# Patient Record
Sex: MALE | Race: AMERICAN INDIAN OR ALASKA NATIVE
[De-identification: names, ages, dates, MRNs, and addresses within clinical notes are randomized per-mention and may not be internally consistent; named-entity substitution may affect disease eponyms.]

---

## 2017-09-25 ENCOUNTER — HOSPITAL ENCOUNTER (OUTPATIENT)
Dept: HOSPITAL 42 - ED | Age: 82
Setting detail: OBSERVATION
LOS: 1 days | Discharge: HOME | End: 2017-09-26
Attending: INTERNAL MEDICINE | Admitting: INTERNAL MEDICINE
Payer: MEDICARE

## 2017-09-25 VITALS — BODY MASS INDEX: 26 KG/M2

## 2017-09-25 VITALS — OXYGEN SATURATION: 98 %

## 2017-09-25 DIAGNOSIS — R55: Primary | ICD-10-CM

## 2017-09-25 DIAGNOSIS — I48.91: ICD-10-CM

## 2017-09-25 DIAGNOSIS — I11.9: ICD-10-CM

## 2017-09-25 DIAGNOSIS — Z95.2: ICD-10-CM

## 2017-09-25 DIAGNOSIS — Z95.1: ICD-10-CM

## 2017-09-25 DIAGNOSIS — Z79.01: ICD-10-CM

## 2017-09-25 LAB
ALBUMIN/GLOB SERPL: 1.1 {RATIO} (ref 1.1–1.8)
ALP SERPL-CCNC: 104 U/L (ref 38–126)
ALT SERPL-CCNC: 16 U/L (ref 7–56)
APTT BLD: 38 SECONDS (ref 23.7–30.8)
AST SERPL-CCNC: 36 U/L (ref 17–59)
BASOPHILS # BLD AUTO: 0.02 K/MM3 (ref 0–2)
BASOPHILS NFR BLD: 0.2 % (ref 0–3)
BILIRUB SERPL-MCNC: 1.3 MG/DL (ref 0.2–1.3)
BUN SERPL-MCNC: 45 MG/DL (ref 7–21)
CALCIUM SERPL-MCNC: 9.5 MG/DL (ref 8.4–10.5)
CHLORIDE SERPL-SCNC: 101 MMOL/L (ref 98–107)
CO2 SERPL-SCNC: 27 MMOL/L (ref 21–33)
EOSINOPHIL # BLD: 0.1 10*3/UL (ref 0–0.7)
EOSINOPHIL NFR BLD: 0.9 % (ref 1.5–5)
ERYTHROCYTE [DISTWIDTH] IN BLOOD BY AUTOMATED COUNT: 14.2 % (ref 11.5–14.5)
GLOBULIN SER-MCNC: 3.8 GM/DL
GLUCOSE SERPL-MCNC: 120 MG/DL (ref 70–110)
GRANULOCYTES # BLD: 6.25 10*3/UL (ref 1.4–6.5)
GRANULOCYTES NFR BLD: 70.7 % (ref 50–68)
HCT VFR BLD CALC: 39.2 % (ref 42–52)
INR PPP: 2.47 (ref 0.93–1.08)
LYMPHOCYTES # BLD: 1.5 10*3/UL (ref 1.2–3.4)
LYMPHOCYTES NFR BLD AUTO: 16.7 % (ref 22–35)
MCH RBC QN AUTO: 29.1 PG (ref 25–35)
MCHC RBC AUTO-ENTMCNC: 33.7 G/DL (ref 31–37)
MCV RBC AUTO: 86.5 FL (ref 80–105)
MONOCYTES # BLD AUTO: 1 10*3/UL (ref 0.1–0.6)
MONOCYTES NFR BLD: 11.5 % (ref 1–6)
PLATELET # BLD: 199 10^3/UL (ref 120–450)
PMV BLD AUTO: 9.8 FL (ref 7–11)
POTASSIUM SERPL-SCNC: 4.6 MMOL/L (ref 3.6–5)
PROT SERPL-MCNC: 7.9 G/DL (ref 5.8–8.3)
SODIUM SERPL-SCNC: 138 MMOL/L (ref 132–148)
TROPONIN I SERPL-MCNC: < 0.01 NG/ML
WBC # BLD AUTO: 8.9 10^3/UL (ref 4.5–11)

## 2017-09-25 PROCEDURE — 84484 ASSAY OF TROPONIN QUANT: CPT

## 2017-09-25 PROCEDURE — 85610 PROTHROMBIN TIME: CPT

## 2017-09-25 PROCEDURE — 85730 THROMBOPLASTIN TIME PARTIAL: CPT

## 2017-09-25 PROCEDURE — 70450 CT HEAD/BRAIN W/O DYE: CPT

## 2017-09-25 PROCEDURE — 71010: CPT

## 2017-09-25 PROCEDURE — 85025 COMPLETE CBC W/AUTO DIFF WBC: CPT

## 2017-09-25 PROCEDURE — 99285 EMERGENCY DEPT VISIT HI MDM: CPT

## 2017-09-25 PROCEDURE — 93005 ELECTROCARDIOGRAM TRACING: CPT

## 2017-09-25 PROCEDURE — 96360 HYDRATION IV INFUSION INIT: CPT

## 2017-09-25 PROCEDURE — 96361 HYDRATE IV INFUSION ADD-ON: CPT

## 2017-09-25 PROCEDURE — 80053 COMPREHEN METABOLIC PANEL: CPT

## 2017-09-25 PROCEDURE — 82550 ASSAY OF CK (CPK): CPT

## 2017-09-25 PROCEDURE — 83615 LACTATE (LD) (LDH) ENZYME: CPT

## 2017-09-25 NOTE — CT
PROCEDURE:  CT HEAD WITHOUT CONTRAST.



HISTORY:

syncope



COMPARISON:

None available. 



TECHNIQUE:

Axial computed tomography images were obtained through the head/brain 

without intravenous contrast.  



Radiation dose:



Total exam DLP = 801.89 mGy-cm.



This CT exam was performed using one or more of the following dose 

reduction techniques: Automated exposure control, adjustment of the 

mA and/or kV according to patient size, and/or use of iterative 

reconstruction technique.



FINDINGS:



HEMORRHAGE:

No intracranial hemorrhage. 



BRAIN:

 Diffuse expansion of the ventriculosulcal and cisternal spaces is 

appreciated with white matter lucency compatible with diffuse 

cerebral atrophy and chronic microangiopathy. There is no mass effect,

 definite cortical edema or suspicious extra-axial fluid colllection. 

The posterior fossa and midline anatomy, including the corpus 

callosum and brainstem appear unremarkable.



VENTRICLES:

Unremarkable. No hydrocephalus. 



CALVARIUM:

Unremarkable.



PARANASAL SINUSES:

Unremarkable as visualized. No significant inflammatory changes.



MASTOID AIR CELLS:

Unremarkable as visualized. No inflammatory changes.



OTHER FINDINGS:

None.



IMPRESSION:

Age related neuro neuro degenerative changes are identified without 

acute intracranial findings as discussed above. Follow up CT or MRI 

are available if clinically warranted.

## 2017-09-25 NOTE — RAD
HISTORY:

cough  



COMPARISON:

No prior. 



FINDINGS:



LUNGS:

No active pulmonary disease.



PLEURA:

No significant pleural effusion identified, no pneumothorax apparent.



CARDIOVASCULAR:

Mild cardiomegaly is noted with prosthetic heart valve in place.  

Sternotomy wires are noted.



OSSEOUS STRUCTURES:

No significant abnormalities.



VISUALIZED UPPER ABDOMEN:

Normal.



OTHER FINDINGS:

None.



IMPRESSION:

Cardiomegaly is noted.  Sternotomy wires seen as well as prosthetic 

cardiac valve.  No acute pulmonary disease appreciable. No pulmonary 

vascular derangement.

## 2017-09-25 NOTE — ED PDOC
Arrival/HPI





- General


Chief Complaint: Trauma


Time Seen by Provider: 09/25/17 13:52


Historian: Patient





- History of Present Illness


Narrative History of Present Illness (Text): 


09/25/17 13:54


A 81 year old male presents to the emergency department complaining of a 

syncopal episode prior to arrival. Patient reports feeling diaphoretic prior to 

episode. He states the last thing he remembers is talking on the phone with his 

daughter. Patient regained consciousness shortly after. He denies any pain or 

discomfort at this time. Patient denies any head trauma, headache, dizziness, 

neck pain, fever, chills, nausea, vomiting, abdominal pain, back pain, chest 

pain, palpitations, shortness of breath or any other complaints. 





PMD: Dr. Fidencio Robles





Time/Duration: Prior to Arrival


Symptom Course: Resolved


Quality: Other


Context: Home





Past Medical History





- Provider Review


Nursing Documentation Reviewed: Yes





- Cardiac


Hx Hypertension: Yes


Hx Pacemaker: No





- Pulmonary


Hx Respiratory Disorders: No





- Neurological


Hx Neurological Disorder: No


Hx Paralysis: No





- HEENT


Hx HEENT Disorder: No





- Renal


Hx Renal Disorder: No





- Endocrine/Metabolic


Hx Endocrine Disorders: No





- Hematological/Oncological


Hx Blood Disorders: No


Hx Blood Transfusions: No


Hx Blood Transfusion Reaction: No





- Integumentary


Hx Dermatological Disorder: No





- Musculoskeletal/Rheumatological


Hx Musculoskeletal Disorders: No





- Gastrointestinal


Hx Gastrointestinal Disorders: No





- Genitourinary/Gynecological


Hx Genitourinary Disorders: No





- Psychiatric


Hx Psychophysiologic Disorder: No


Hx Emotional Abuse: No


Hx Physical Abuse: No


Hx Substance Use: No





- Surgical History


Hx Coronary Artery Bypass Graft: Yes





- Anesthesia


Hx Anesthesia Reactions: No


Hx Malignant Hyperthermia: No





- Suicidal Assessment


Feels Threatened In Home Enviroment: No





Family/Social History





- Physician Review


Nursing Documentation Reviewed: Yes


Family/Social History: No Known Family HX


Smoking Status: Never Smoked


Hx Alcohol Use: No


Hx Substance Use: No


Hx Substance Use Treatment: No





Allergies/Home Meds


Allergies/Adverse Reactions: 


Allergies





No Known Allergies Allergy (Verified 07/13/12 21:07)


 








Home Medications: 


 Home Meds











 Medication  Instructions  Recorded  Confirmed


 


Metoprolol Succinate [Toprol XL] 100 mg PO DAILY 07/13/12 09/25/17


 


Simvastatin 20 mg PO DAILY 05/01/17 09/25/17


 


Valsartan/Hydrochlorothiazide 1 each PO DAILY 05/01/17 09/25/17





[Valsartan-Hctz 80-12.5 mg Tab]   


 


Warfarin [Coumadin] 4 mg PO DAILY 05/01/17 09/25/17














Physical Exam





- Physical Exam


Narrative Physical Exam (Text): 


- Review of Systems





Constitutional: Normal.  absent: Fatigue, Weight Change, Fevers


Eyes: Normal


ENT: denies sore throat, denies tristhmus


Respiratory: Normal.  absent: SOB, Cough, Sputum


Cardiovascular: (+) Syncope absent: Chest Pain, Palpitations 


Gastrointestinal: Normal.  absent: Abdominal Pain, Diarrhea, Nausea, Vomiting


Genitourinary: Normal.  absent: Dysuria, Frequency, Hematuria


Musculoskeletal: Normal.  absent: Arthralgias, Back Pain, Neck Pain


Skin: no rashes, no erythema


Neurological: absent: Focal Weakness


Endocrine: Normal


Hemo/Lymphatic: Normal


Psychiatric: No suicidal or homicidal ideations





Physical exam





Patient appears age appropriate in no distress, speaking full sentences without 

difficulty





Head atraumatic. No nasal bone deformity or tenderness, no facial or jaw pain/

swelling. No neck midline tenderness, thoracic and lumbar spine with no midline 

tenderness. Pt moving b/l upper and lower extremities without difficulty, 5/5 

strength, with full active and passive ROM. Distal neurovasc fully intact. Abd 

soft/nt/nd, no hematomas, no peritoneal signs. Neg. pelvic rock. 





- Systems Exam


Head: Present: Atraumatic, Normocephalic


Pupils: Present: PERRL


Extroacular Muscles: Present: EOMI


Conjunctiva: Present: Normal


Mouth: Present: Moist Mucous Membranes


Neck: Present: Normal Range of Motion.  No: MIDLINE TENDERNESS, Paraspinal 

Tenderness


Respiratory/Chest: Present: Clear to Auscultation, Good Air Exchange.  No: 

Respiratory Distress, Accessory Muscle Use, Tachypneic


Cardiovascular: Present: Regular Rate and Rhythm, Normal S1, S2, Peripheal 

Pulses Present.  No: Murmurs


Abdomen: Present: Normal Bowel Sounds.  No: Tenderness, Distention, Peritoneal 

Signs, Rebound, Guarding


Back: Present: Normal Inspection.  No: Midline Tenderness, Paraspinal Tenderness


Upper Extremity: Present: Normal Inspection.  No: Cyanosis, Edema


Lower Extremity: Present: Normal Inspection.  No: Edema


Neurological: Present: GCS=15, Speech Normal, cranial nerves II through XII 

fully intact with no cerebellar abnormality, neurosensory fully intact. No 

focal neurological deficits.


Skin: Present: Warm, Dry, Normal Color.  No: Rashes


Lymphatic: Present: OX3, NI, NC


Psychiatric: Present: Alert, Oriented x 3, Normal Insight, Normal Concentration





Vital Signs Reviewed: Yes


Vital Signs











  Pulse Resp BP Pulse Ox


 


 09/25/17 15:37  92 H  18  124/67  97


 


 09/25/17 13:46  98 H  18  104/76  96











Blood Pressure: Normal


Pulse: Tachycardic


Respiratory Rate: Normal


Appearance: Positive for: Well-Appearing, Non-Toxic, Comfortable


Pain Distress: None


Mental Status: Positive for: Alert and Oriented X 3





Medical Decision Making


ED Course and Treatment: 


09/25/17 13:54


Impression:


A 81 year old male presents after syncopal episode. Physical exam unremarkable. 

No focal neurological deficits





Differential Diagnosis included but are not limited to: Dehydration vs. 

Vasovagal





Plan:


-- Head CT


-- Chest xray


-- Labs


-- IV fluids


-- Reassess and disposition





Progress Notes:








09/25/17 15:17


EKG:


Ordered, reviewed, and independently interpreted the EKG.


Rate : 90 BPM


Rhythm : sinus arrhythmia


Interpretation : No ST/T segment elevations, normal intervals





09/25/17 15:17


chest xray:


Creator : Umberto Chairez MD


FINDINGS:


LUNGS: No active pulmonary disease.


PLEURA: No significant pleural effusion identified, no pneumothorax apparent.


CARDIOVASCULAR: Mild cardiomegaly is noted with prosthetic heart valve in 

place.  Sternotomy wires are noted.


OSSEOUS STRUCTURES: No significant abnormalities.


VISUALIZED UPPER ABDOMEN: Normal.


IMPRESSION: Cardiomegaly is noted.  Sternotomy wires seen as well as prosthetic 

cardiac valve.  No acute pulmonary disease appreciable. No pulmonary vascular 

derangement.





09/25/17 15:49


BUN/Cre elevated


more fluids ordered


pt will be obs'd under Dr. Robles's service. In no distress, denies complaints.





09/25/17 16:17


dw Dr. Robles in detail, accepted pt to obs under his service


pt aware of and agrees with plan








CT Radiology IMPRESSION:


Age related neuro neuro degenerative changes are identified without acute 

intracranial findings as discussed above. Follow up CT or MRI are available if 

clinically warranted.








- Lab Interpretations


Lab Results: 








 09/25/17 14:21 





 09/25/17 14:21 





 Lab Results





09/25/17 14:21: Sodium 138, Potassium 4.6, Chloride 101, Carbon Dioxide 27, 

Anion Gap 15, BUN 45 H, Creatinine 2.3 H, Est GFR ( Amer) 33, Est GFR (

Non-Af Amer) 28, Random Glucose 120 H, Calcium 9.5, Total Bilirubin 1.3, AST 36

, ALT 16, Alkaline Phosphatase 104, Lactate Dehydrogenase 560, Total Creatine 

Kinase 184, Troponin I < 0.01, Total Protein 7.9, Albumin 4.1, Globulin 3.8, 

Albumin/Globulin Ratio 1.1


09/25/17 14:21: PT 26.7 H, INR 2.47 H, APTT 38.0 H


09/25/17 14:21: WBC 8.9, RBC 4.53, Hgb 13.2 L, Hct 39.2 L, MCV 86.5, MCH 29.1, 

MCHC 33.7, RDW 14.2, Plt Count 199, MPV 9.8, Gran % 70.7 H, Lymph % (Auto) 16.7 

L, Mono % (Auto) 11.5 H, Eos % (Auto) 0.9 L, Baso % (Auto) 0.2, Gran # 6.25, 

Lymph # 1.5, Mono # 1.0 H, Eos # 0.1, Baso # 0.02








I have reviewed the lab results: Yes





- RAD Interpretation


Radiology Orders: 








09/25/17 13:53


HEAD W/O CONTRAST [CT] Stat 





09/25/17 13:54


CHEST PORTABLE [RAD] Stat 














- Medication Orders


Current Medication Orders: 








Sodium Chloride (Sodium Chloride 0.9%)  1,000 mls @ 1,000 mls/hr IV .Q1H STA


   Stop: 09/25/17 16:47


   Last Admin: 09/25/17 16:15  Dose: 1,000 mls/hr





eMAR Start Stop


 Document     09/25/17 16:15  MR  (Rec: 09/25/17 16:15  MR  [id number])


     Intravenous Solution


      Start Date                                 09/25/17


      Start Time                                 16:15


      End Date                                   09/25/17


      End time                                   17:15


      Total Infusion Time                        60








Discontinued Medications





Sodium Chloride (Sodium Chloride 0.9%)  1,000 mls @ 1,000 mls/hr IV .Q1H STA


   Stop: 09/25/17 14:52


   Last Admin: 09/25/17 14:23  Dose: 1,000 mls/hr





eMAR Start Stop


 Document     09/25/17 14:23  MR  (Rec: 09/25/17 14:24  MR  [id number])


     Intravenous Solution


      Start Date                                 09/25/17


      Start Time                                 14:23


      End Date                                   09/25/17


      End time                                   15:23


      Total Infusion Time                        60














- Scribe Statement


The provider has reviewed the documentation as recorded by the Scribe


Brea Vivar





Provider Scribe Attestation:


All medical record entries made by the Scribe were at my direction and 

personally dictated by me. I have reviewed the chart and agree that the record 

accurately reflects my personal performance of the history, physical exam, 

medical decision making, and the department course for this patient. I have 

also personally directed, reviewed, and agree with the discharge instructions 

and disposition.








Disposition/Present on Arrival





- Present on Arrival


Any Indicators Present on Arrival: No


History of DVT/PE: No


History of Uncontrolled Diabetes: No


Urinary Catheter: No


History of Decub. Ulcer: No


History Surgical Site Infection Following: None





- Disposition


Have Diagnosis and Disposition been Completed?: Yes


Diagnosis: 


 Syncope





Disposition: HOSPITALIZED


Disposition Time: 16:18


Patient Plan: Observation


Condition: FAIR


Discharge Instructions (ExitCare):  Syncope (ED)


Referrals: 


Fidencio Robles JD, MD [Primary Care Provider] - Follow up with primary


Forms:  [facility] (English)

## 2017-09-26 VITALS
SYSTOLIC BLOOD PRESSURE: 131 MMHG | RESPIRATION RATE: 18 BRPM | TEMPERATURE: 97.8 F | DIASTOLIC BLOOD PRESSURE: 94 MMHG | HEART RATE: 76 BPM

## 2017-09-26 NOTE — CP.PCM.HP
History of Present Illness





- History of Present Illness


History of Present Illness: 





82 yo male h/o HTN, afib, presents after syncopal episode, no witnessed SZ, 

states felt faint, may have lost consciousness for few secs.  CT head neg for 

acute bleed/infarct.  CXR neg.  Mild azotemia, troponin, EKG negative for ACS.  

Pt denies cp, no SOB.  Admitted for OBS





Present on Admission





- Present on Admission


Any Indicators Present on Admission: No





Review of Systems





- Cardiovascular


Cardiovascular: Lightheadedness





Past Patient History





- Past Social History


Smoking Status: Never Smoked





- CARDIAC


Hx Atrial Fibrillation: Yes


Hx Heart Murmur: Yes (s/p valve replacement surgery)


Hx Hypertension: Yes


Hx Pacemaker: No





- PULMONARY


Hx Respiratory Disorders: No





- NEUROLOGICAL


Hx Neurological Disorder: No





- HEENT


Hx HEENT Problems: No





- RENAL


Hx Chronic Kidney Disease: No





- ENDOCRINE/METABOLIC


Hx Endocrine Disorders: No





- HEMATOLOGICAL/ONCOLOGICAL


Hx Blood Disorders: No





- INTEGUMENTARY


Hx Dermatological Problems: No





- MUSCULOSKELETAL/RHEUMATOLOGICAL


Hx Falls: Yes





- GASTROINTESTINAL


Hx Gastrointestinal Disorders: No





- GENITOURINARY/GYNECOLOGICAL


Hx Genitourinary Disorders: No





- PSYCHIATRIC


Hx Psychophysiologic Disorder: No


Hx Emotional Abuse: No


Hx Physical Abuse: No





- SURGICAL HISTORY


Hx Surgeries: Yes (CABG, MITRAL VALVE REPAIR, R EYE SX)





- ANESTHESIA


Hx Anesthesia Reactions: No


Hx Malignant Hyperthermia: No





Meds


Home Medications: 


 Home Medication List











 Medication  Instructions  Recorded  Confirmed  Type


 


Metoprolol Succinate [Toprol XL] 100 mg PO BRK  tab 09/26/17  Rx











Allergies/Adverse Reactions: 


 Allergies











Allergy/AdvReac Type Severity Reaction Status Date / Time


 


No Known Allergies Allergy   Verified 07/13/12 21:07














Physical Exam





- Constitutional


Appears: Well





- Head Exam


Head Exam: ATRAUMATIC, NORMOCEPHALIC





- Eye Exam


Eye Exam: EOMI, PERRL





- Neck Exam


Neck exam: Positive for: Normal Inspection





- Respiratory Exam


Respiratory Exam: Clear to Auscultation Bilateral, NORMAL BREATHING PATTERN





- Cardiovascular Exam


Cardiovascular Exam: Irregular Rhythm, Systolic Murmur





- GI/Abdominal Exam


GI & Abdominal Exam: Normal Bowel Sounds, Soft





- Extremities Exam


Extremities exam: Positive for: normal inspection





- Neurological Exam


Neurological exam: Alert, Normal Gait, Oriented x3





- Skin


Skin Exam: Dry, Warm





Results





- Vital Signs


Recent Vital Signs: 





 Last Vital Signs











Temp  98.3 F   09/26/17 06:00


 


Pulse  88   09/26/17 09:16


 


Resp  20   09/26/17 06:00


 


BP  125/71   09/26/17 09:16


 


Pulse Ox  98   09/26/17 00:45














- Labs


Result Diagrams: 


 09/25/17 14:21





 09/25/17 14:21





Assessment & Plan


(1) Syncope


Status: Acute   





(2) Atrial fibrillation


Status: Acute   





(3) Hypertension


Status: Acute   





- Assessment and Plan (Free Text)


Plan: 


stable on telemetry for OBS, hold Diovan HCT, monitor lytes, renal function








- Date & Time


Date: 09/26/17


Time: 09:00

## 2017-09-26 NOTE — CARD
--------------- APPROVED REPORT --------------





EKG Measurement

Heart [id number]

WA P87

[id number]

[id number]

[id number]



<Conclusion>

Atrial flutter with variable block

LVH by voltage

No change

## 2018-12-19 ENCOUNTER — HOSPITAL ENCOUNTER (OUTPATIENT)
Dept: HOSPITAL 42 - CATH | Age: 83
LOS: 1 days | Discharge: HOME | End: 2018-12-20
Attending: INTERNAL MEDICINE
Payer: MEDICARE

## 2018-12-19 VITALS — BODY MASS INDEX: 25.6 KG/M2

## 2018-12-19 DIAGNOSIS — I48.91: ICD-10-CM

## 2018-12-19 DIAGNOSIS — Z95.1: ICD-10-CM

## 2018-12-19 DIAGNOSIS — Z95.2: ICD-10-CM

## 2018-12-19 DIAGNOSIS — I25.110: Primary | ICD-10-CM

## 2018-12-19 LAB
APTT BLD: 26.3 SECONDS (ref 25.1–36.5)
BASOPHILS # BLD AUTO: 0.01 K/MM3 (ref 0–2)
BASOPHILS NFR BLD: 0.2 % (ref 0–3)
BUN SERPL-MCNC: 18 MG/DL (ref 7–21)
CALCIUM SERPL-MCNC: 9.3 MG/DL (ref 8.4–10.5)
EOSINOPHIL # BLD: 0.2 10*3/UL (ref 0–0.7)
EOSINOPHIL NFR BLD: 3.2 % (ref 1.5–5)
ERYTHROCYTE [DISTWIDTH] IN BLOOD BY AUTOMATED COUNT: 13.5 % (ref 11.5–14.5)
GFR NON-AFRICAN AMERICAN: 58
GRANULOCYTES # BLD: 3.69 10*3/UL (ref 1.4–6.5)
GRANULOCYTES NFR BLD: 59 % (ref 50–68)
HGB BLD-MCNC: 12.5 G/DL (ref 14–18)
INR PPP: 1.22
LYMPHOCYTES # BLD: 1.6 10*3/UL (ref 1.2–3.4)
LYMPHOCYTES NFR BLD AUTO: 25.4 % (ref 22–35)
MCH RBC QN AUTO: 28.5 PG (ref 25–35)
MCHC RBC AUTO-ENTMCNC: 32.1 G/DL (ref 31–37)
MCV RBC AUTO: 88.8 FL (ref 80–105)
MONOCYTES # BLD AUTO: 0.8 10*3/UL (ref 0.1–0.6)
MONOCYTES NFR BLD: 12.2 % (ref 1–6)
PLATELET # BLD: 162 10^3/UL (ref 120–450)
PMV BLD AUTO: 9.8 FL (ref 7–11)
PROTHROMBIN TIME: 14.1 SECONDS (ref 9.4–12.5)
RBC # BLD AUTO: 4.38 10^6/UL (ref 3.5–6.1)
WBC # BLD AUTO: 6.3 10^3/UL (ref 4.5–11)

## 2018-12-19 PROCEDURE — 85610 PROTHROMBIN TIME: CPT

## 2018-12-19 PROCEDURE — 99153 MOD SED SAME PHYS/QHP EA: CPT

## 2018-12-19 PROCEDURE — 93005 ELECTROCARDIOGRAM TRACING: CPT

## 2018-12-19 PROCEDURE — 93459 L HRT ART/GRFT ANGIO: CPT

## 2018-12-19 PROCEDURE — 85730 THROMBOPLASTIN TIME PARTIAL: CPT

## 2018-12-19 PROCEDURE — 99152 MOD SED SAME PHYS/QHP 5/>YRS: CPT

## 2018-12-19 PROCEDURE — 80048 BASIC METABOLIC PNL TOTAL CA: CPT

## 2018-12-19 PROCEDURE — 85025 COMPLETE CBC W/AUTO DIFF WBC: CPT

## 2018-12-19 PROCEDURE — 86900 BLOOD TYPING SEROLOGIC ABO: CPT

## 2018-12-19 PROCEDURE — 36415 COLL VENOUS BLD VENIPUNCTURE: CPT

## 2018-12-19 PROCEDURE — 86850 RBC ANTIBODY SCREEN: CPT

## 2018-12-19 NOTE — CARD
--------------- APPROVED REPORT --------------





Date of service: 12/19/2018



EKG Measurement

Heart [id number]

[id number]

[id number]

[id number]



<Conclusion>

Atrial flutter with variable AV block

LVH by voltage

Prolonged QTc

## 2018-12-19 NOTE — CARD
--------------- APPROVED REPORT --------------





Date of service: 12/19/2018



EKG Measurement

Heart [id number]

IN [id number]

[id number]

[id number]

[id number]



<Conclusion>

Atrial flutter with variable block.

LVH

## 2018-12-19 NOTE — CARDCATH
PROCEDURE DATE:  12/19/2018



CARDIAC CATHETERIZATION AND PTCA



HISTORY:  The patient is a 83-year-old male who presents with exertional

chest pain.  His stress test was abnormal because of this and cardiac

catheterization was recommended.



The patient is status post coronary bypass surgery as well as mitral valve

replacement.  He also suffers from atrial fibrillation.



PROCEDURE:  Left heart catheterization with coronary aortography, left

ventriculogram, LIMA angiogram, SVG, aortic root angiogram followed by PTCA

and stent of an RCA through a bypass graft.



The right femoral artery was cannulated with 6-Israeli sheath.  There were

no complications.



I performed moderate sedation which included the presence of an independent

trained observer that assisted in monitoring the patient's level of

consciousness and physiologic status.  After administration of Versed and

fentanyl, my intra service time was 45 minutes.



The findings on catheterization revealed a left ventricle that contracted

normally.  Estimated ejection fraction of 60%.  There was no mitral

regurgitation across the prosthetic valve.



Supra-aortic valvular injection revealed no aortic insufficiency.



His coronary anatomy revealed a left main artery was unremarkable.



The LAD was occluded.



The circumflex system was occluded with retrograde filling of the obtuse

marginal branch to a bypass graft.



The RCA was diffusely diseased.



The saphenous vein graft to the RCA was found to be patent and provided

good antegrade flow to the distal RCA as well as into the PDA.



The PDA revealed a new 90% lesion in its midportion.



The saphenous vein graft to the obtuse marginal branch was found to be

patent divided antegrade flow.



The LIMA to the LAD was found to be patent and provided good antegrade

flow.



The patient was started on intravenous Angiomax on the fluoroscopic guide,

a multipurpose catheter was placed in the ostium of the SVG to the RCA.  An

0.014 ATW wire was used to cross the critical lesion.



2.0 balloon was utilized to predilate the lesion.  This was followed by

implantation of 2.5 x 12 mm drug-eluting stent that was deployed at 14

atmospheres of pressure.  Repeat coronary aortography revealed an excellent

result with no residual stenosis and AN III flow.  The patient tolerated

the procedure well.



In summary, the procedure revealed triple-vessel CAD.

Patent LIMA to the LAD.

Patent SVG to the OM.

Patent SVG to the RCA with a new 90% lesion in the RCA distal to the

saphenous vein graft.

LV function is normal with no mitral regurgitation across the prosthetic

mitral valve.

No AI on supra-aortic valvular injection.

Successful PTCA and stent of the distal RCA through the saphenous vein

graft with a drug-eluting stent.



Given these findings, the patient will need to remain on aspirin and

Plavix.  We will restart the patient on Coumadin.  Once he is an

outpatient, we will continue on Coumadin and Plavix with Plavix for least a

year.  He needs to undergo a strict cardiac risk reduction program.







__________________________________________

Silvano Gonzales MD





DD:  12/19/2018 14:17:21

DT:  12/19/2018 14:20:55

Job # [id number]

## 2018-12-20 VITALS
TEMPERATURE: 98.6 F | SYSTOLIC BLOOD PRESSURE: 141 MMHG | DIASTOLIC BLOOD PRESSURE: 90 MMHG | RESPIRATION RATE: 20 BRPM | OXYGEN SATURATION: 98 %

## 2018-12-20 VITALS — HEART RATE: 101 BPM

## 2018-12-20 LAB
BASOPHILS # BLD AUTO: 0.01 K/MM3 (ref 0–2)
BASOPHILS NFR BLD: 0.1 % (ref 0–3)
BUN SERPL-MCNC: 16 MG/DL (ref 7–21)
CALCIUM SERPL-MCNC: 9.1 MG/DL (ref 8.4–10.5)
EOSINOPHIL # BLD: 0.1 10*3/UL (ref 0–0.7)
EOSINOPHIL NFR BLD: 1.7 % (ref 1.5–5)
ERYTHROCYTE [DISTWIDTH] IN BLOOD BY AUTOMATED COUNT: 13.4 % (ref 11.5–14.5)
GFR NON-AFRICAN AMERICAN: > 60
GRANULOCYTES # BLD: 4.92 10*3/UL (ref 1.4–6.5)
GRANULOCYTES NFR BLD: 71.7 % (ref 50–68)
HGB BLD-MCNC: 12.6 G/DL (ref 14–18)
LYMPHOCYTES # BLD: 1.3 10*3/UL (ref 1.2–3.4)
LYMPHOCYTES NFR BLD AUTO: 18.9 % (ref 22–35)
MCH RBC QN AUTO: 28.3 PG (ref 25–35)
MCHC RBC AUTO-ENTMCNC: 32.4 G/DL (ref 31–37)
MCV RBC AUTO: 87.2 FL (ref 80–105)
MONOCYTES # BLD AUTO: 0.5 10*3/UL (ref 0.1–0.6)
MONOCYTES NFR BLD: 7.6 % (ref 1–6)
PLATELET # BLD: 160 10^3/UL (ref 120–450)
PMV BLD AUTO: 9.4 FL (ref 7–11)
RBC # BLD AUTO: 4.46 10^6/UL (ref 3.5–6.1)
WBC # BLD AUTO: 6.9 10^3/UL (ref 4.5–11)

## 2018-12-20 NOTE — PN
DATE:  12/20/2018



SUBJECTIVE:  The patient is chest pain free status post PTCA and stent of

RCA to vein graft.



A drug-eluting stent was utilized.



PHYSICAL EXAMINATION:

VITAL SIGNS:  Blood pressure is 104/90 with a heart rate in the 90s, atrial

fibrillation.

NECK:  Negative JVD.

LUNGS:  Without rales.

HEART:  With S1, S2.

EXTREMITIES:  Without edema.



LABORATORY DATA:  Hemoglobin is 12.6.  Chemistries, BUN and creatinine

unremarkable.



IMPRESSION:

1.  Stable post percutaneous transluminal coronary angioplasty and stent of

an right coronary artery through her vein graft.

2.  Atrial fibrillation.

3.  History of mitral valve replacement.

4.  History of coronary bypass surgery.



Given these findings, I will order 10 mg of Coumadin today.  In the

morning, the patient will restart on his Coumadin of 4 mg daily.  As an

outpatient, he will be on Coumadin as well as Plavix as well as statin

therapy.  A followup instructions were given to the patient in detail.







__________________________________________

Silvano Gonzales MD





DD:  12/20/2018 10:19:18

DT:  12/20/2018 10:21:41

Job # [id number]

## 2019-03-15 ENCOUNTER — HOSPITAL ENCOUNTER (EMERGENCY)
Dept: HOSPITAL 42 - ED | Age: 84
Discharge: HOME | End: 2019-03-15
Payer: MEDICARE

## 2019-03-15 VITALS — RESPIRATION RATE: 18 BRPM | TEMPERATURE: 97.6 F

## 2019-03-15 VITALS — SYSTOLIC BLOOD PRESSURE: 146 MMHG | DIASTOLIC BLOOD PRESSURE: 96 MMHG | OXYGEN SATURATION: 97 % | HEART RATE: 72 BPM

## 2019-03-15 VITALS — BODY MASS INDEX: 25.6 KG/M2

## 2019-03-15 DIAGNOSIS — Z79.01: ICD-10-CM

## 2019-03-15 DIAGNOSIS — I48.91: ICD-10-CM

## 2019-03-15 DIAGNOSIS — K92.2: Primary | ICD-10-CM

## 2019-03-15 DIAGNOSIS — Z95.1: ICD-10-CM

## 2019-03-15 DIAGNOSIS — E78.5: ICD-10-CM

## 2019-03-15 DIAGNOSIS — Z79.02: ICD-10-CM

## 2019-03-15 DIAGNOSIS — Z95.5: ICD-10-CM

## 2019-03-15 DIAGNOSIS — I10: ICD-10-CM

## 2019-03-15 LAB
ALBUMIN SERPL-MCNC: 3.9 G/DL (ref 3–4.8)
ALBUMIN/GLOB SERPL: 1.2 {RATIO} (ref 1.1–1.8)
ALT SERPL-CCNC: < 6 U/L (ref 7–56)
APTT BLD: 29.4 SECONDS (ref 26.9–38.3)
AST SERPL-CCNC: 34 U/L (ref 17–59)
BASOPHILS # BLD AUTO: 0.01 K/MM3 (ref 0–2)
BASOPHILS NFR BLD: 0.2 % (ref 0–3)
BUN SERPL-MCNC: 15 MG/DL (ref 7–21)
CALCIUM SERPL-MCNC: 9.1 MG/DL (ref 8.4–10.5)
EOSINOPHIL # BLD: 0.2 10*3/UL (ref 0–0.7)
EOSINOPHIL NFR BLD: 3.7 % (ref 1.5–5)
ERYTHROCYTE [DISTWIDTH] IN BLOOD BY AUTOMATED COUNT: 13.8 % (ref 11.5–14.5)
GFR NON-AFRICAN AMERICAN: 53
HGB BLD-MCNC: 12.9 G/DL (ref 14–18)
INR PPP: 1.15
LYMPHOCYTES # BLD: 1.6 10*3/UL (ref 1.2–3.4)
LYMPHOCYTES NFR BLD AUTO: 31.6 % (ref 22–35)
MCH RBC QN AUTO: 28.6 PG (ref 25–35)
MCHC RBC AUTO-ENTMCNC: 32.1 G/DL (ref 31–37)
MCV RBC AUTO: 89.1 FL (ref 80–105)
MONOCYTES # BLD AUTO: 0.5 10*3/UL (ref 0.1–0.6)
MONOCYTES NFR BLD: 9.2 % (ref 1–6)
PLATELET # BLD: 169 10^3/UL (ref 120–450)
PMV BLD AUTO: 10.1 FL (ref 7–11)
PROTHROMBIN TIME: 13 SECONDS (ref 9.4–12.5)
RBC # BLD AUTO: 4.51 10^6/UL (ref 3.5–6.1)
WBC # BLD AUTO: 5.1 10^3/UL (ref 4.5–11)

## 2019-03-15 NOTE — CARD
--------------- APPROVED REPORT --------------





Date of service: 03/15/2019



EKG Measurement

Heart [id number]

SD P246

[id number]

[id number]

[id number]



<Conclusion>

Atrial flutter with variable AV block

Abnormal ECG

## 2019-03-15 NOTE — ED PDOC
Arrival/HPI





- General


Chief Complaint: GI Problem


Historian: Patient, Family





- History of Present Illness


Narrative History of Present Illness (Text): 





03/15/19 08:26





An 83 year old male, whose past medical history includes A-fib, hypertension, 

and hyperlipidemia, triple bypass, and stents, patient on Coumadin and Plavix, 

presents to the emergency department for further evaluation of blood in stool. 

The patient reports that he has been noticing blood in stool and also when he 

wipes himself after a bowl movement. He reports that the red stools are 

intermittent, and states that it could be what he is eating. His daughter, at 

bedside, notes that she called Dr. Gonzales who advised the patient to come into the

emergency department for further evaluation and to make sure that there is "no 

blockage" as per the patient's daughter. Patient notes that he takes Coumadin 

and the dosage was recently increased from 4mg to 5 mg by Dr. Robles. He reports 

that he stopped taking the Coumadin for the past 2 days, because he has noticed 

that when he takes the medication is when the bloody stools occur. He denies 

fevers, chills, headache, dizziness, chest pain, shortness of breath, dyspnea on

exertion, cough, abdominal pain, nausea, vomiting, diarrhea, back pain, neck 

pain, urinary/bowel changes, or any other complaint.








PMD: Dr. KATIANA Robles


Cardiologist: Dr. Gonzales








Past Medical History





- Provider Review


Nursing Documentation Reviewed: Yes





- Infectious Disease


Hx of Infectious Diseases: None





- Cardiac


Hx Pacemaker: No





- Pulmonary


Hx Respiratory Disorders: No





- Neurological


Hx Paralysis: No





- HEENT


Hx HEENT Disorder: No





- Renal


Hx Renal Disorder: No





- Endocrine/Metabolic


Hx Endocrine Disorders: No





- Hematological/Oncological


Hx Blood Transfusions: No





- Integumentary


Hx Dermatological Disorder: No





- Musculoskeletal/Rheumatological


Hx Musculoskeletal Disorders: No





- Gastrointestinal


Hx Gastrointestinal Disorders: No





- Genitourinary/Gynecological


Hx Genitourinary Disorders: No





- Psychiatric


Hx Emotional Abuse: No


Hx Physical Abuse: No


Hx Substance Use: No





- Surgical History


Hx Coronary Artery Bypass Graft: Yes





- Anesthesia


Hx Anesthesia: Yes


Hx Anesthesia Reactions: No


Hx Malignant Hyperthermia: No





- Suicidal Assessment


Feels Threatened In Home Enviroment: No





Family/Social History





- Physician Review


Nursing Documentation Reviewed: Yes


Family/Social History: No Known Family HX


Smoking Status: Never Smoked


Hx Alcohol Use: No


Hx Substance Use: No


Hx Substance Use Treatment: No





Allergies/Home Meds


Allergies/Adverse Reactions: 


Allergies





No Known Allergies Allergy (Verified 07/13/12 21:07)


   








Home Medications: 


                                    Home Meds











 Medication  Instructions  Recorded  Confirmed


 


Warfarin [Coumadin] 4 mg PO DAILY 05/01/17 03/15/19


 


Atorvastatin [Lipitor] 40 mg PO DAILY 03/15/19 03/15/19


 


Clopidogrel Bisulfate [Plavix] 75 mg PO DAILY 03/15/19 03/15/19














Review of Systems





- Physician Review


All systems were reviewed & negative as marked: Yes





- Review of Systems


Constitutional: absent: Fevers


Respiratory: absent: SOB, Cough


Cardiovascular: absent: Chest Pain, QUINTANILLA


Gastrointestinal: Hematochezia.  absent: Abdominal Pain, Diarrhea, Nausea, 

Vomiting


Musculoskeletal: absent: Back Pain, Neck Pain


Neurological: absent: Headache, Dizziness





Physical Exam


Vital Signs Reviewed: Yes





Vital Signs











  Temp Pulse Resp BP Pulse Ox


 


 03/15/19 08:16  97.6 F  76  18  140/91 H  99











Temperature: Afebrile


Blood Pressure: Hypertensive


Pulse: Regular


Respiratory Rate: Normal


Appearance: Positive for: Well-Appearing, Non-Toxic, Comfortable


Pain Distress: None


Mental Status: Positive for: Alert and Oriented X 3





- Systems Exam


Head: Present: Atraumatic, Normocephalic


Pupils: Present: PERRL


Extroacular Muscles: Present: EOMI


Conjunctiva: Present: Normal (Pink)


Mouth: Present: Moist Mucous Membranes


Neck: Present: Normal Range of Motion


Respiratory/Chest: Present: Clear to Auscultation, Good Air Exchange.  No: 

Respiratory Distress, Accessory Muscle Use


Cardiovascular: Present: Irregular Rhythm.  No: Murmurs


Abdomen: No: Tenderness, Distention, Peritoneal Signs


Rectal: Present: Normal Rectal Tone, Other (Skin tag. Brown stool, guaiac 

negative. Chaperone- Scribe E. Thayer present. ).  No: Hemorrhoids


Back: Present: Normal Inspection


Upper Extremity: Present: Normal Inspection.  No: Cyanosis, Edema


Lower Extremity: Present: Normal Inspection.  No: Edema


Neurological: Present: GCS=15, CN II-XII Intact, Speech Normal


Skin: Present: Warm, Dry, Normal Color.  No: Rashes


Psychiatric: Present: Alert, Oriented x 3, Normal Insight, Normal Concentration





Medical Decision Making


ED Course and Treatment: 





03/15/19 08:39





Impression:


An 83 year old male presents to the emergency department for further evaluation 

of blood in stool. 





Differential Diagnosis included but are not limited to:  GI bleed secondary to 

Coumadin





Plan:


-- EKG


-- Labs


-- Reassess and disposition





Prior Visits:


Notes and results from previous visits were reviewed. 





Progress Notes:





EKG: Ordered, reviewed, and independently interpreted the EKG.


Rate : 66 BPM


Rhythm : A- Fib


Interpretation : 1st degree AV block








03/15/19 10:35: Case discussed with Dr. KATIANA Robles, and we agree that since 

patient is completely asymptomatic and there is no bloody stool and stool is 

brown, HGB normal, that patient can be discharged and will follow up with him on

 Monday. He will discuss case further with Dr. Gonzales. Requests Dr. Quinn for 

outpatient GI consult.  





03/15/19 10:44


On re-evaluation, patient feels better and is in no acute distress. I have 

discussed the results and plan with the patient, who expresses understanding. 

Patient in agreement with plan to be discharged home. Patient is stable for 

discharge. Patient was instructed to follow up with physician or return if 

symptoms worsen or new concerning symptoms arise.











- Lab Interpretations


I have reviewed the lab results: Yes





- EKG Interpretation


Interpreted by ED Physician: Yes


Type: 12 lead EKG





- Scribe Statement


The provider has reviewed the documentation as recorded by the Gurpreet Thayer 





Provider Scribe Attestation:


All medical record entries made by the Scribe were at my direction and 

personally dictated by me. I have reviewed the chart and agree that the record 

accurately reflects my personal performance of the history, physical exam, 

medical decision making, and the department course for this patient. I have also

 personally directed, reviewed, and agree with the discharge instructions and 

disposition.








Disposition/Present on Arrival





- Present on Arrival


Any Indicators Present on Arrival: No


History of DVT/PE: No


History of Uncontrolled Diabetes: No


Urinary Catheter: No


History of Decub. Ulcer: No


History Surgical Site Infection Following: None





- Disposition


Have Diagnosis and Disposition been Completed?: Yes


Diagnosis: 


 GI bleeding





Disposition: HOME/ ROUTINE


Disposition Time: 10:45


Patient Plan: Discharge


Condition: IMPROVED


Discharge Instructions (ExitCare):  Gastrointestinal Bleeding


Additional Instructions: 





RIOS JORDAN, thank you for letting us take care of you today. Your provider 

was Efra L Rabines DO and you were treated for GI bleeding. The emergency 

medical care you received today was directed at your acute symptoms. If you were

 prescribed any medication, please fill it and take as directed. It may take 

several days for your symptoms to resolve. Return to the Emergency Department if

 your symptoms worsen, do not improve, or if you have any other problems.





Please contact your doctor or call one of the physicians/clinics you have been 

referred to that are listed on the Patient Visit Information form that is 

included in your discharge packet. Bring any paperwork you were given at 

discharge with you along with any medications you are taking to your follow up 

visit. Our treatment cannot replace ongoing medical care by a primary care 

provider outside of the emergency department.





Thank you for allowing the [email or web address] team to be part of your care today.








If you had an X-Ray or CT scan: A Radiologist will review the ED reading if any 

change in treatment is needed we will contact you.***





If you had a blood, urine, or wound culture: It will take several days for the 

results, if any change in treatment is needed we will contact you.***





If you had an STI test: It will take 48 hours for the results. Please call after

 1 week if you have not heard back.***


Referrals: 


Fidencio Robles JD, MD [Family Provider] - Follow up with primary


Raz Quinn MD [Medical Doctor] - Follow up with primary


Silvano Gonzales MD [Staff Provider] - Follow up with primary


Forms:  Jike Xueyuan (English), WORK NOTE